# Patient Record
Sex: FEMALE | Race: WHITE | NOT HISPANIC OR LATINO | Employment: OTHER | URBAN - METROPOLITAN AREA
[De-identification: names, ages, dates, MRNs, and addresses within clinical notes are randomized per-mention and may not be internally consistent; named-entity substitution may affect disease eponyms.]

---

## 2018-06-08 ENCOUNTER — ESTABLISHED COMPREHENSIVE EXAM (OUTPATIENT)
Dept: URBAN - METROPOLITAN AREA CLINIC 39 | Facility: CLINIC | Age: 73
End: 2018-06-08

## 2018-06-08 VITALS
SYSTOLIC BLOOD PRESSURE: 158 MMHG | HEART RATE: 59 BPM | RESPIRATION RATE: 14 BRPM | HEIGHT: 55 IN | DIASTOLIC BLOOD PRESSURE: 84 MMHG

## 2018-06-08 DIAGNOSIS — H04.123: ICD-10-CM

## 2018-06-08 DIAGNOSIS — Z96.1: ICD-10-CM

## 2018-06-08 DIAGNOSIS — H18.51: ICD-10-CM

## 2018-06-08 DIAGNOSIS — H35.372: ICD-10-CM

## 2018-06-08 PROCEDURE — G8952 PRE-HTN/HTN, NO F/U, NOT GVN: HCPCS

## 2018-06-08 PROCEDURE — 1036F TOBACCO NON-USER: CPT

## 2018-06-08 PROCEDURE — 92015 DETERMINE REFRACTIVE STATE: CPT

## 2018-06-08 PROCEDURE — 92134 CPTRZ OPH DX IMG PST SGM RTA: CPT

## 2018-06-08 PROCEDURE — G8427 DOCREV CUR MEDS BY ELIG CLIN: HCPCS

## 2018-06-08 PROCEDURE — 92014 COMPRE OPH EXAM EST PT 1/>: CPT

## 2018-06-08 ASSESSMENT — VISUAL ACUITY
OS_SC: 20/200
OU_SC: 20/50
OD_SC: 20/40-2
OD_SC: J6
OS_SC: J2
OD_CC: 20/25
OS_CC: 20/25
OU_CC: 20/20-2

## 2018-06-08 ASSESSMENT — TONOMETRY
OD_IOP_MMHG: 14
OS_IOP_MMHG: 14

## 2019-06-07 ENCOUNTER — ESTABLISHED COMPREHENSIVE EXAM (OUTPATIENT)
Dept: URBAN - METROPOLITAN AREA CLINIC 39 | Facility: CLINIC | Age: 74
End: 2019-06-07

## 2019-06-07 DIAGNOSIS — H04.123: ICD-10-CM

## 2019-06-07 DIAGNOSIS — Z98.890: ICD-10-CM

## 2019-06-07 DIAGNOSIS — Z96.1: ICD-10-CM

## 2019-06-07 DIAGNOSIS — H18.51: ICD-10-CM

## 2019-06-07 DIAGNOSIS — H35.372: ICD-10-CM

## 2019-06-07 PROCEDURE — 92014 COMPRE OPH EXAM EST PT 1/>: CPT

## 2019-06-07 PROCEDURE — 92015 DETERMINE REFRACTIVE STATE: CPT

## 2019-06-07 ASSESSMENT — VISUAL ACUITY
OS_SC: 20/200
OS_CC: 20/25-2
OD_SC: J6
OS_SC: J3
OD_CC: 20/25-1
OD_SC: 20/200

## 2019-06-07 ASSESSMENT — TONOMETRY
OS_IOP_MMHG: 18
OD_IOP_MMHG: 19

## 2020-06-30 ENCOUNTER — ESTABLISHED COMPREHENSIVE EXAM (OUTPATIENT)
Dept: URBAN - METROPOLITAN AREA CLINIC 39 | Facility: CLINIC | Age: 75
End: 2020-06-30

## 2020-06-30 DIAGNOSIS — Z96.1: ICD-10-CM

## 2020-06-30 DIAGNOSIS — Z98.890: ICD-10-CM

## 2020-06-30 DIAGNOSIS — H35.372: ICD-10-CM

## 2020-06-30 DIAGNOSIS — H43.813: ICD-10-CM

## 2020-06-30 DIAGNOSIS — H18.51: ICD-10-CM

## 2020-06-30 DIAGNOSIS — H04.123: ICD-10-CM

## 2020-06-30 PROCEDURE — 92015 DETERMINE REFRACTIVE STATE: CPT

## 2020-06-30 PROCEDURE — 92014 COMPRE OPH EXAM EST PT 1/>: CPT

## 2020-06-30 ASSESSMENT — VISUAL ACUITY
OS_CC: 20/25
OU_SC: 20/50
OD_SC: J2
OD_CC: 20/30
OU_CC: 20/25
OD_SC: 20/70
OS_SC: 20/100
OU_SC: J1
OS_SC: J1

## 2020-06-30 ASSESSMENT — TONOMETRY
OS_IOP_MMHG: 18
OD_IOP_MMHG: 17

## 2021-05-27 NOTE — PATIENT DISCUSSION
Discussed option of lens exchange. Patient states since having cataract surgery OS vision has been poor. Should she be considering lens exchange no YAG should be performed. Patient is approaching one year anniversary of left eye surgery. Told she needs to make decisions as soon as possible for lens exchanged or YAG laser. Discussed some risk of lens exchange, recommend patient have a discussion with Dr. Castillo Ware.

## 2021-05-27 NOTE — PATIENT DISCUSSION
Discussed option of lens exchange. Patient states since having cataract surgery OS vision has been poor. Should she be considering lens exchange no YAG should be performed. Patient is approaching one year anniversary of left eye surgery. Told she needs to make decisions as soon as possible for lens exchanged or YAG laser. Discussed some risk of lens exchange, recommend patient have a discussion with Dr. Hannah Rawls.

## 2021-06-07 NOTE — PATIENT DISCUSSION
PCO OS- PT UNDERSTANDS A YAG CAPSULOTOMY IS AN OPTION ALTHOUGH SHE WOULD NO LONGER BE A CANDIDATE FOR IOL EXCHANGE AFTER TREATMENT.

## 2021-06-07 NOTE — PATIENT DISCUSSION
IOL MALFUNCTION OS - PT STATES VISION HAS BEEN POOR SINCE PHACO POD #1. SHE HAS A TECHNIS MONOFOCAL IN THE RIGHT EYE WITHOUT COMPLICATIONS AND A TECHNIS MULTI-FOCAL IOL IN THE LEFT EYE THAT HAS BEEN DISTORTING VISION SINCE CATARACT SURGERY, PER PATIENT. PT STATES THAT HER LEFT EYE USE TO BE HER BETTER EYE PRIOR TO PHACO BUT NOW HER RIGHT EYE IS HER BEST EYE. PT IS LIKELY NOT TOLERATING THE MULTI-FOCAL IOL. VISUAL SYMPTOM DO NOT IMPROVE WITH REFRACTION IN OFFICE OR WITH CURRENT GLASSES. OPTION TO KEEP THE LENS, TREAT THE PCO AND IOL EXCHANGE HAVE BEEN GIVEN. PT DECLINES YAG AND WISHES TO PROCEED WITH IOL EXCHANGE FOR A SINGLE VISION MONOFOCAL IOL OS. SCHEDULE IOL EXCHANGE OS.

## 2021-06-07 NOTE — PATIENT DISCUSSION
New Prescription: prednisol ace-gatiflox-bromfen (prednisol ace-gatiflox-bromfen): drops,suspension: 1-0.5-0.075% 1 drop three times a day as directed [facility] 06-

## 2021-06-16 NOTE — PATIENT DISCUSSION
Continue: prednisol ace-gatiflox-bromfen (prednisol ace-gatiflox-bromfen): drops,suspension: 1-0.5-0.075% 1 drop three times a day [facility]

## 2021-07-01 ENCOUNTER — ESTABLISHED COMPREHENSIVE EXAM (OUTPATIENT)
Dept: URBAN - METROPOLITAN AREA CLINIC 39 | Facility: CLINIC | Age: 76
End: 2021-07-01

## 2021-07-01 DIAGNOSIS — H35.372: ICD-10-CM

## 2021-07-01 DIAGNOSIS — Z98.890: ICD-10-CM

## 2021-07-01 DIAGNOSIS — Z96.1: ICD-10-CM

## 2021-07-01 DIAGNOSIS — H04.123: ICD-10-CM

## 2021-07-01 DIAGNOSIS — H18.513: ICD-10-CM

## 2021-07-01 DIAGNOSIS — H43.813: ICD-10-CM

## 2021-07-01 PROCEDURE — 92015 DETERMINE REFRACTIVE STATE: CPT

## 2021-07-01 PROCEDURE — 92134 CPTRZ OPH DX IMG PST SGM RTA: CPT

## 2021-07-01 PROCEDURE — 92014 COMPRE OPH EXAM EST PT 1/>: CPT

## 2021-07-01 ASSESSMENT — VISUAL ACUITY
OD_SC: J2
OU_CC: 20/30-1
OS_CC: 20/40
OS_SC: 20/200
OU_SC: 20/70
OD_CC: 20/30-2
OS_SC: J1
OD_SC: 20/100+1
OU_SC: J1+

## 2021-07-01 ASSESSMENT — TONOMETRY
OD_IOP_MMHG: 16
OS_IOP_MMHG: 16

## 2021-07-13 NOTE — PATIENT DISCUSSION
S/P IOL EXCHANGE, OS-  DOING WELL. PATIENT ED TO START OMNI TAPER: BID OS X 1 WEEK THEN QD OS X 1 WEEK THEN STOP. DISCUSSED WILL RX NEW GLS AFTER YAG OS, CONTINUE WITH OTC READERS UNTIL THEN.

## 2021-07-13 NOTE — PATIENT DISCUSSION
Stopped Today: prednisol ace-gatiflox-bromfen (prednisol ace-gatiflox-bromfen): drops,suspension: 1-0.5-0.075% 1 drop three times a day [facility]

## 2021-07-13 NOTE — PATIENT DISCUSSION
POSTERIOR CAPSULAR FIBROSIS/OPACIFICATION, OS- VISUALLY SIGNIFICANT. SCHEDULE YAG EVAL WITH DR Zeb Samayoa.

## 2021-10-19 NOTE — PATIENT DISCUSSION
Discussed diagnosis with patient, who feels it is visually significant and would like to pursue surgical repair. Recommend referral to Dr. Mirta Cintron for evaluation and management.

## 2024-05-29 ENCOUNTER — COMPREHENSIVE EXAM (OUTPATIENT)
Dept: URBAN - METROPOLITAN AREA CLINIC 39 | Facility: CLINIC | Age: 79
End: 2024-05-29

## 2024-05-29 DIAGNOSIS — H35.372: ICD-10-CM

## 2024-05-29 DIAGNOSIS — H40.053: ICD-10-CM

## 2024-05-29 DIAGNOSIS — H40.023: ICD-10-CM

## 2024-05-29 DIAGNOSIS — Z96.1: ICD-10-CM

## 2024-05-29 DIAGNOSIS — H18.513: ICD-10-CM

## 2024-05-29 DIAGNOSIS — Z98.890: ICD-10-CM

## 2024-05-29 DIAGNOSIS — H43.813: ICD-10-CM

## 2024-05-29 DIAGNOSIS — H04.123: ICD-10-CM

## 2024-05-29 PROCEDURE — 92134 CPTRZ OPH DX IMG PST SGM RTA: CPT

## 2024-05-29 PROCEDURE — 92015 DETERMINE REFRACTIVE STATE: CPT

## 2024-05-29 PROCEDURE — 92014 COMPRE OPH EXAM EST PT 1/>: CPT

## 2024-05-29 ASSESSMENT — VISUAL ACUITY
OS_SC: J1-1
OD_SC: J2
OD_CC: 20/25-2
OU_SC: J1-1
OU_CC: 20/20-2
OS_CC: 20/20-3
OU_SC: 20/60-1
OS_SC: 20/200
OD_SC: 20/60-1

## 2024-05-29 ASSESSMENT — TONOMETRY
OS_IOP_MMHG: 23
OD_IOP_MMHG: 22

## 2024-07-11 ENCOUNTER — FOLLOW UP (OUTPATIENT)
Dept: URBAN - METROPOLITAN AREA CLINIC 39 | Facility: CLINIC | Age: 79
End: 2024-07-11

## 2024-07-11 DIAGNOSIS — H40.053: ICD-10-CM

## 2024-07-11 DIAGNOSIS — H40.023: ICD-10-CM

## 2024-07-11 PROCEDURE — 92012 INTRM OPH EXAM EST PATIENT: CPT

## 2024-07-11 PROCEDURE — 92020 GONIOSCOPY: CPT

## 2024-07-11 PROCEDURE — 76514 ECHO EXAM OF EYE THICKNESS: CPT

## 2024-07-11 PROCEDURE — 92083 EXTENDED VISUAL FIELD XM: CPT

## 2024-07-11 ASSESSMENT — TONOMETRY
OS_IOP_MMHG: 16
OD_IOP_MMHG: 16

## 2024-07-11 ASSESSMENT — VISUAL ACUITY
OD_SC: 20/40-2
OS_SC: 20/100+1

## 2024-07-11 ASSESSMENT — PACHYMETRY
OS_CT_UM: 594
OD_CT_UM: 583

## 2025-07-16 ENCOUNTER — COMPREHENSIVE EXAM (OUTPATIENT)
Age: 80
End: 2025-07-16

## 2025-07-16 DIAGNOSIS — Z96.1: ICD-10-CM

## 2025-07-16 DIAGNOSIS — H35.372: ICD-10-CM

## 2025-07-16 DIAGNOSIS — H04.123: ICD-10-CM

## 2025-07-16 DIAGNOSIS — H40.053: ICD-10-CM

## 2025-07-16 DIAGNOSIS — H43.813: ICD-10-CM

## 2025-07-16 DIAGNOSIS — H40.023: ICD-10-CM

## 2025-07-16 DIAGNOSIS — H18.513: ICD-10-CM

## 2025-07-16 PROCEDURE — 92014 COMPRE OPH EXAM EST PT 1/>: CPT

## 2025-07-16 PROCEDURE — 92134 CPTRZ OPH DX IMG PST SGM RTA: CPT

## 2025-07-16 PROCEDURE — 92015 DETERMINE REFRACTIVE STATE: CPT
